# Patient Record
Sex: FEMALE | URBAN - METROPOLITAN AREA
[De-identification: names, ages, dates, MRNs, and addresses within clinical notes are randomized per-mention and may not be internally consistent; named-entity substitution may affect disease eponyms.]

---

## 2018-12-07 ENCOUNTER — OFFICE VISIT (OUTPATIENT)
Dept: URBAN - METROPOLITAN AREA CLINIC 81 | Facility: CLINIC | Age: 68
End: 2018-12-07
Payer: MEDICARE

## 2018-12-07 DIAGNOSIS — H31.013 MACULA SCARS OF POSTERIOR POLE (POSTINFLAMMATORY) (POST-TRAUMATIC), BILATERAL: ICD-10-CM

## 2018-12-07 DIAGNOSIS — H43.813 VITREOUS DEGENERATION, BILATERAL: ICD-10-CM

## 2018-12-07 DIAGNOSIS — H52.03 HYPERMETROPIA, BILATERAL: ICD-10-CM

## 2018-12-07 PROCEDURE — 92004 COMPRE OPH EXAM NEW PT 1/>: CPT | Performed by: OPHTHALMOLOGY

## 2018-12-07 ASSESSMENT — INTRAOCULAR PRESSURE
OS: 14
OD: 14

## 2018-12-07 ASSESSMENT — KERATOMETRY
OD: 44.38
OS: 44.75

## 2018-12-07 ASSESSMENT — VISUAL ACUITY
OD: 20/50
OS: 20/40

## 2018-12-07 NOTE — IMPRESSION/PLAN
Impression: Other injuries of left eye and orbit, sequela: S05.8x2S. OS.  by Hx
trauma to OS as child.  Plan: Continue to monitor

## 2018-12-07 NOTE — IMPRESSION/PLAN
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 Sister with Best's Disease. Previously seen by retina with genetic testing showing markers for disease. Was never given diagnosis of Best's Plan: Extensive discussion of retinal issues, Can be difficult to determine source of retinal scarring. Discussed overall influence on vision and option to proceed with ceiol.   Pt may consider consult with Thuan Hidalgo

## 2018-12-07 NOTE — IMPRESSION/PLAN
Impression: Age-related nuclear cataract, bilateral: H25.13 OU. Visually significant. Signficant discussion regarding retinal issues and how ce iol may improved vision however unable to determine level of improvement.  Plan: Pt will think about surgery

## 2019-07-18 ENCOUNTER — OFFICE VISIT (OUTPATIENT)
Dept: URBAN - METROPOLITAN AREA CLINIC 81 | Facility: CLINIC | Age: 69
End: 2019-07-18
Payer: MEDICARE

## 2019-07-18 DIAGNOSIS — H25.13 AGE-RELATED NUCLEAR CATARACT, BILATERAL: Primary | ICD-10-CM

## 2019-07-18 DIAGNOSIS — S05.8X2S OTHER INJURIES OF LEFT EYE AND ORBIT, SEQUELA: ICD-10-CM

## 2019-07-18 DIAGNOSIS — H52.4 PRESBYOPIA: ICD-10-CM

## 2019-07-18 PROCEDURE — 99215 OFFICE O/P EST HI 40 MIN: CPT | Performed by: OPHTHALMOLOGY

## 2019-07-18 RX ORDER — DUREZOL 0.5 MG/ML
0.05 % EMULSION OPHTHALMIC
Qty: 1 | Refills: 1 | Status: INACTIVE
Start: 2019-07-18 | End: 2019-09-23

## 2019-07-18 RX ORDER — OFLOXACIN 3 MG/ML
0.3 % SOLUTION/ DROPS OPHTHALMIC
Qty: 1 | Refills: 1 | Status: INACTIVE
Start: 2019-07-18 | End: 2019-09-23

## 2019-07-18 ASSESSMENT — VISUAL ACUITY
OD: 20/60
OS: 20/50

## 2019-07-18 ASSESSMENT — INTRAOCULAR PRESSURE
OD: 16
OS: 16

## 2019-07-18 ASSESSMENT — KERATOMETRY
OS: 45.00
OD: 44.50

## 2019-07-18 NOTE — IMPRESSION/PLAN
Impression: Other injuries of left eye and orbit, sequela: S05.8x2S. OS.  by Hx
trauma to OS as child. Plan: Continue to monitor. Discussed added risk.

## 2019-07-18 NOTE — IMPRESSION/PLAN
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 Sister with Best's Disease. Previously seen by retina with genetic testing showing markers for disease. Was never given diagnosis of Best's Plan: Extensive discussion of retinal issues, Can be difficult to determine source of retinal scarring. Discussed overall influence on vision and option to proceed with ceiol. Recommend consult w/ Dr Guerrero Alexis prior to cataract sx. Discussed added risk.

## 2019-07-18 NOTE — IMPRESSION/PLAN
Impression: Age-related nuclear cataract, bilateral: H25.13 OU. Visually significant. Signficant discussion regarding retinal issues and how ce iol may improved vision however unable to determine level of improvement. Plan: Cataracts account for the patient's complaints. Discussed all risks, benefits, procedures and recovery. Patient understands changing glasses will not improve vision. Discussed added risk due to RPE scarring OU, injury to OS, and astigmatism. Patient desires to have surgery, recommend CE IOL OU, OD first.  Discussed iol options, recommend STANDARD  IOL . TARGET: DISTANCE   RL2 Discussed astigmatism diagnosis with patient. Patient understands that standard lens does not correct for astigmatism and patient will need gls for distance and near after Cataract Surgery. Patient understands. Patient will need consult w/ Dr Caridad Fields prior to cataract sx.  
Will need Ascan

## 2019-09-09 ENCOUNTER — OFFICE VISIT (OUTPATIENT)
Dept: URBAN - METROPOLITAN AREA CLINIC 76 | Facility: CLINIC | Age: 69
End: 2019-09-09
Payer: MEDICARE

## 2019-09-09 DIAGNOSIS — H35.343 MACULAR CYST, HOLE, OR PSEUDOHOLE, BILATERAL: Primary | ICD-10-CM

## 2019-09-09 PROCEDURE — 92134 CPTRZ OPH DX IMG PST SGM RTA: CPT | Performed by: OPHTHALMOLOGY

## 2019-09-09 PROCEDURE — 99213 OFFICE O/P EST LOW 20 MIN: CPT | Performed by: OPHTHALMOLOGY

## 2019-09-09 ASSESSMENT — INTRAOCULAR PRESSURE
OD: 15
OS: 15

## 2019-09-09 NOTE — IMPRESSION/PLAN
Impression: Age-related nuclear cataract, bilateral: H25.13. OU.  Plan: No retinal contraindication to CE IOL

## 2019-09-09 NOTE — IMPRESSION/PLAN
Impression: Macular cyst, hole, or pseudohole, bilateral: H35.343. OU. Plan: OCT ordered and performed today. Discussed diagnosis with patient. The clinical exam and OCT are consistent with Macular Hole. Given the patients current symptoms and difficulties with daily activities surgical correction is not recommended. Discussed with patient I recommend observation at this time due to RPE and pigment disruption. Patient understands and agrees with plan. Discussed microscopic telescope lens, having great distance vision but loss of peripheral vision. Discussed with patient to research her options prior to having surgery, due to having a macular disease. Recommend a low vision consult for the future or prior to having cataract surgery. Discussed in great length and detail with patient her options and given Dr. Narcisa Marquez information.

## 2019-09-23 ENCOUNTER — PRE-OPERATIVE VISIT (OUTPATIENT)
Dept: URBAN - METROPOLITAN AREA CLINIC 76 | Facility: CLINIC | Age: 69
End: 2019-09-23
Payer: MEDICARE

## 2019-09-23 PROCEDURE — 92136 OPHTHALMIC BIOMETRY: CPT | Performed by: OPHTHALMOLOGY

## 2019-09-23 RX ORDER — DUREZOL 0.5 MG/ML
0.05 % EMULSION OPHTHALMIC
Qty: 5 | Refills: 1 | Status: INACTIVE
Start: 2019-09-23 | End: 2019-11-22

## 2019-09-23 ASSESSMENT — PACHYMETRY
OS: 22.23
OS: 2.71
OD: 21.96
OD: 2.66

## 2019-10-07 ENCOUNTER — SURGERY (OUTPATIENT)
Dept: URBAN - METROPOLITAN AREA SURGERY 47 | Facility: SURGERY | Age: 69
End: 2019-10-07
Payer: MEDICARE

## 2019-10-07 PROCEDURE — 66984 XCAPSL CTRC RMVL W/O ECP: CPT | Performed by: OPHTHALMOLOGY

## 2019-10-08 ENCOUNTER — POST-OPERATIVE VISIT (OUTPATIENT)
Dept: URBAN - METROPOLITAN AREA CLINIC 81 | Facility: CLINIC | Age: 69
End: 2019-10-08
Payer: MEDICARE

## 2019-10-08 PROCEDURE — 99024 POSTOP FOLLOW-UP VISIT: CPT | Performed by: OPTOMETRIST

## 2019-10-08 ASSESSMENT — INTRAOCULAR PRESSURE
OS: 16
OD: 16

## 2019-10-17 ENCOUNTER — POST-OPERATIVE VISIT (OUTPATIENT)
Dept: URBAN - METROPOLITAN AREA CLINIC 81 | Facility: CLINIC | Age: 69
End: 2019-10-17
Payer: MEDICARE

## 2019-10-17 PROCEDURE — 99024 POSTOP FOLLOW-UP VISIT: CPT | Performed by: OPTOMETRIST

## 2019-10-17 ASSESSMENT — INTRAOCULAR PRESSURE: OD: 12

## 2019-10-17 ASSESSMENT — VISUAL ACUITY
OS: 20/40-
OD: 20/70-

## 2019-10-24 ENCOUNTER — POST-OPERATIVE VISIT (OUTPATIENT)
Dept: URBAN - METROPOLITAN AREA CLINIC 76 | Facility: CLINIC | Age: 69
End: 2019-10-24
Payer: MEDICARE

## 2019-10-24 PROCEDURE — 99024 POSTOP FOLLOW-UP VISIT: CPT | Performed by: OPHTHALMOLOGY

## 2019-10-24 ASSESSMENT — KERATOMETRY
OS: 45.00
OD: 45.00

## 2019-10-24 ASSESSMENT — INTRAOCULAR PRESSURE
OD: 12
OS: 14

## 2019-10-24 ASSESSMENT — VISUAL ACUITY
OS: 20/40
OD: 20/200

## 2019-10-29 ENCOUNTER — POST-OPERATIVE VISIT (OUTPATIENT)
Dept: URBAN - METROPOLITAN AREA CLINIC 76 | Facility: CLINIC | Age: 69
End: 2019-10-29
Payer: MEDICARE

## 2019-10-29 PROCEDURE — 99024 POSTOP FOLLOW-UP VISIT: CPT | Performed by: OPHTHALMOLOGY

## 2019-10-29 ASSESSMENT — VISUAL ACUITY
OS: 20/40-
OD: 20/70-

## 2019-10-29 ASSESSMENT — INTRAOCULAR PRESSURE
OS: 14
OD: 13

## 2019-11-07 ENCOUNTER — POST-OPERATIVE VISIT (OUTPATIENT)
Dept: URBAN - METROPOLITAN AREA CLINIC 81 | Facility: CLINIC | Age: 69
End: 2019-11-07
Payer: MEDICARE

## 2019-11-07 DIAGNOSIS — Z09 ENCNTR FOR F/U EXAM AFT TRTMT FOR COND OTH THAN MALIG NEOPLM: Primary | ICD-10-CM

## 2019-11-07 PROCEDURE — 99024 POSTOP FOLLOW-UP VISIT: CPT | Performed by: OPHTHALMOLOGY

## 2019-11-07 ASSESSMENT — INTRAOCULAR PRESSURE
OD: 16
OS: 16

## 2019-11-07 ASSESSMENT — VISUAL ACUITY
OD: 20/80-
OS: 20/40+2

## 2019-11-22 ENCOUNTER — POST-OPERATIVE VISIT (OUTPATIENT)
Dept: URBAN - METROPOLITAN AREA CLINIC 81 | Facility: CLINIC | Age: 69
End: 2019-11-22
Payer: MEDICARE

## 2019-11-22 PROCEDURE — 99024 POSTOP FOLLOW-UP VISIT: CPT | Performed by: OPHTHALMOLOGY

## 2019-11-22 RX ORDER — KETOROLAC TROMETHAMINE 5 MG/ML
0.5 % SOLUTION OPHTHALMIC
Qty: 5 | Refills: 2 | Status: INACTIVE
Start: 2019-11-22 | End: 2019-12-05

## 2019-11-22 ASSESSMENT — VISUAL ACUITY: OS: 20/40+

## 2019-11-22 ASSESSMENT — INTRAOCULAR PRESSURE
OS: 16
OD: 13

## 2019-12-05 ENCOUNTER — OFFICE VISIT (OUTPATIENT)
Dept: URBAN - METROPOLITAN AREA CLINIC 76 | Facility: CLINIC | Age: 69
End: 2019-12-05
Payer: MEDICARE

## 2019-12-05 DIAGNOSIS — Z96.1 PRESENCE OF INTRAOCULAR LENS: ICD-10-CM

## 2019-12-05 PROCEDURE — 92012 INTRM OPH EXAM EST PATIENT: CPT | Performed by: OPHTHALMOLOGY

## 2019-12-05 PROCEDURE — 92134 CPTRZ OPH DX IMG PST SGM RTA: CPT | Performed by: OPHTHALMOLOGY

## 2019-12-05 ASSESSMENT — INTRAOCULAR PRESSURE
OD: 10
OS: 11

## 2019-12-05 NOTE — IMPRESSION/PLAN
Impression: Macular cyst, hole, or pseudohole, bilateral: H35.343 OU. Plan: Exam/OCT show stable deep lamellar hole, RPE changes, atrophy, and cystic changes OU, no change when compared to previous scans. No intervention indicated, will follow. 

6 months, OCT OU

## 2019-12-20 ENCOUNTER — POST-OPERATIVE VISIT (OUTPATIENT)
Dept: URBAN - METROPOLITAN AREA CLINIC 81 | Facility: CLINIC | Age: 69
End: 2019-12-20
Payer: MEDICARE

## 2019-12-20 PROCEDURE — 99024 POSTOP FOLLOW-UP VISIT: CPT | Performed by: OPHTHALMOLOGY

## 2019-12-20 ASSESSMENT — INTRAOCULAR PRESSURE
OS: 16
OD: 13

## 2020-01-27 ENCOUNTER — OFFICE VISIT (OUTPATIENT)
Dept: URBAN - METROPOLITAN AREA CLINIC 76 | Facility: CLINIC | Age: 70
End: 2020-01-27
Payer: MEDICARE

## 2020-01-27 PROCEDURE — 92134 CPTRZ OPH DX IMG PST SGM RTA: CPT | Performed by: OPHTHALMOLOGY

## 2020-01-27 PROCEDURE — 99214 OFFICE O/P EST MOD 30 MIN: CPT | Performed by: OPHTHALMOLOGY

## 2020-01-27 RX ORDER — OFLOXACIN 3 MG/ML
0.3 % SOLUTION/ DROPS OPHTHALMIC
Qty: 1 | Refills: 0 | Status: INACTIVE
Start: 2020-01-27 | End: 2020-08-26

## 2020-01-27 RX ORDER — PREDNISOLONE ACETATE 10 MG/ML
1 % SUSPENSION/ DROPS OPHTHALMIC
Qty: 1 | Refills: 2 | Status: INACTIVE
Start: 2020-01-27 | End: 2020-08-26

## 2020-01-27 ASSESSMENT — INTRAOCULAR PRESSURE
OD: 10
OS: 10

## 2020-01-27 NOTE — IMPRESSION/PLAN
Impression: Macular cyst, hole, or pseudohole, bilateral: H35.343. OU. Lamellar hole OS, with atrophy Plan: OCT ordered and performed today. Discussed diagnosis with patient. The clinical exam and OCT are consistent with Macular Hole. Given the patients current symptoms and difficulties with daily activities surgical correction is recommended. Prognosis is guarded due to quality of vision prior to CE surgery, Recommend sx, after a through discussion of surgical R/B/A. The patient understands the potential risks of sx, including (but not limited to) bleeding, pain, infection, loss of vision, loss of eye and possible need for more sx. The patient was also informed of post operative altitude with caution with face down positioning with gas in the eye. The patient understand if the hole closes successfully the vision usually improves, however guarded prognosis due to previous vision quality. The patient elects to proceed with sx.  RL-2

## 2020-02-24 ENCOUNTER — SURGERY (OUTPATIENT)
Dept: URBAN - METROPOLITAN AREA SURGERY 47 | Facility: SURGERY | Age: 70
End: 2020-02-24
Payer: MEDICARE

## 2020-02-24 PROCEDURE — 67040 LASER TREATMENT OF RETINA: CPT | Performed by: OPHTHALMOLOGY

## 2020-02-24 RX ORDER — HYDROCODONE BITARTRATE AND IBUPROFEN 7.5; 2 MG/1; MG/1
TABLET, FILM COATED ORAL
Qty: 6 | Refills: 0 | Status: INACTIVE
Start: 2020-02-24 | End: 2020-02-28

## 2020-02-25 ENCOUNTER — POST-OPERATIVE VISIT (OUTPATIENT)
Dept: URBAN - METROPOLITAN AREA CLINIC 76 | Facility: CLINIC | Age: 70
End: 2020-02-25
Payer: MEDICARE

## 2020-02-25 ASSESSMENT — INTRAOCULAR PRESSURE
OD: 12
OS: 11

## 2020-02-25 NOTE — IMPRESSION/PLAN
Impression: S/P VItrectomy w/Rem Gas Bubble OD - 1 Day. Post operative instructions reviewed - looks good Plan: Continue to monitor. Start Pred Forte and Ocuflox as directed. Patient face down 1 week or as directed by Dr Caridad Fields.

## 2020-02-28 ENCOUNTER — POST-OPERATIVE VISIT (OUTPATIENT)
Dept: URBAN - METROPOLITAN AREA CLINIC 76 | Facility: CLINIC | Age: 70
End: 2020-02-28
Payer: MEDICARE

## 2020-02-28 PROCEDURE — 99024 POSTOP FOLLOW-UP VISIT: CPT | Performed by: OPHTHALMOLOGY

## 2020-02-28 RX ORDER — HYDROCODONE BITARTRATE AND IBUPROFEN 7.5; 2 MG/1; MG/1
TABLET, FILM COATED ORAL
Qty: 6 | Refills: 0 | Status: INACTIVE
Start: 2020-02-28 | End: 2021-04-27

## 2020-02-28 ASSESSMENT — INTRAOCULAR PRESSURE
OD: 6
OS: 11

## 2020-03-06 ENCOUNTER — OFFICE VISIT (OUTPATIENT)
Dept: URBAN - METROPOLITAN AREA CLINIC 81 | Facility: CLINIC | Age: 70
End: 2020-03-06
Payer: MEDICARE

## 2020-03-06 DIAGNOSIS — H25.12 AGE-RELATED NUCLEAR CATARACT, LEFT EYE: ICD-10-CM

## 2020-03-06 DIAGNOSIS — H00.014 HORDEOLUM OF LEFT UPPER EYELID: Primary | ICD-10-CM

## 2020-03-06 PROCEDURE — 92012 INTRM OPH EXAM EST PATIENT: CPT | Performed by: OPHTHALMOLOGY

## 2020-03-06 RX ORDER — TOBRAMYCIN AND DEXAMETHASONE 3; 1 MG/ML; MG/ML
SUSPENSION/ DROPS OPHTHALMIC
Qty: 1 | Refills: 0 | Status: INACTIVE
Start: 2020-03-06 | End: 2020-08-26

## 2020-03-06 ASSESSMENT — INTRAOCULAR PRESSURE: OS: 18

## 2020-08-26 ENCOUNTER — OFFICE VISIT (OUTPATIENT)
Dept: URBAN - METROPOLITAN AREA CLINIC 81 | Facility: CLINIC | Age: 70
End: 2020-08-26
Payer: MEDICARE

## 2020-08-26 PROCEDURE — 99212 OFFICE O/P EST SF 10 MIN: CPT | Performed by: OPTOMETRIST

## 2020-08-26 RX ORDER — TOBRAMYCIN AND DEXAMETHASONE 3; 1 MG/ML; MG/ML
SUSPENSION/ DROPS OPHTHALMIC
Qty: 1 | Refills: 1 | Status: INACTIVE
Start: 2020-08-26 | End: 2021-04-27

## 2020-08-26 ASSESSMENT — INTRAOCULAR PRESSURE
OS: 10
OD: 9

## 2020-08-26 NOTE — IMPRESSION/PLAN
Impression: Dry eye syndrome of bilateral lacrimal glands: R49.278.

 - OD>OS
 - Cornea appears grossly clear today
 - pt reports nasal lid margin involved as well. .. may be low-key dacryo infection/etc. 
 Plan: Rx: tobradex TID x14 days then 2,1 x1 week each RTC PRN if not improved/worsens

## 2021-04-27 ENCOUNTER — OFFICE VISIT (OUTPATIENT)
Dept: URBAN - METROPOLITAN AREA CLINIC 81 | Facility: CLINIC | Age: 71
End: 2021-04-27
Payer: MEDICARE

## 2021-04-27 DIAGNOSIS — H04.123 DRY EYE SYNDROME OF BILATERAL LACRIMAL GLANDS: Chronic | ICD-10-CM

## 2021-04-27 PROCEDURE — 92134 CPTRZ OPH DX IMG PST SGM RTA: CPT | Performed by: OPTOMETRIST

## 2021-04-27 PROCEDURE — 92014 COMPRE OPH EXAM EST PT 1/>: CPT | Performed by: OPTOMETRIST

## 2021-04-27 RX ORDER — LOTEPREDNOL ETABONATE 5 MG/ML
0.5 % SUSPENSION/ DROPS OPHTHALMIC
Qty: 5 | Refills: 1 | Status: INACTIVE
Start: 2021-04-27 | End: 2022-04-04

## 2021-04-27 RX ORDER — CYCLOSPORINE 0.5 MG/ML
0.05 % EMULSION OPHTHALMIC
Qty: 60 | Refills: 11 | Status: INACTIVE
Start: 2021-04-27 | End: 2022-04-04

## 2021-04-27 ASSESSMENT — INTRAOCULAR PRESSURE
OD: 9
OS: 12

## 2021-04-27 ASSESSMENT — KERATOMETRY
OD: 44.75
OS: 44.75

## 2021-04-27 ASSESSMENT — VISUAL ACUITY: OS: 20/60

## 2021-04-27 NOTE — IMPRESSION/PLAN
Impression: Dry eye syndrome of bilateral lacrimal glands: H04.123. Plan: Worsening. Failed conservative management with Prednisolone, and AT. Start Lotemax OU QID x 7 days then BID x 7 days. Start Restasis OU BID (sample dispensed). Discussed diagnosis in detail with patient. Dry eye accounts for the patient's symptoms. Dry eye is a chronic condition and does not have a cure and will need artificial tears for maintenance. Recommend Systane Complete or Refresh Relieva OU QID longterm. Monitor for changes.

## 2021-04-27 NOTE — IMPRESSION/PLAN
Impression: Age-related nuclear cataract, left eye: H25.12. Plan: Observe for worsening. No treatment currently recommended as cataracts do not affect the patients day to day life. Patient to monitor for vision changes and if vision significantly worsens, advised to RTC for evaluation.

## 2021-04-27 NOTE — IMPRESSION/PLAN
Impression: Macular cyst, hole, or pseudohole, bilateral: H35.343. OU.
-OD s/p vitrectomy with gas bubble 02/24/2020
-OS Lamellar hole, with atrophy Plan: Discussed diagnosis with patient. Order OCT macula, stable OU. Recommend observation at this time.

## 2022-04-04 ENCOUNTER — OFFICE VISIT (OUTPATIENT)
Dept: URBAN - METROPOLITAN AREA CLINIC 43 | Facility: CLINIC | Age: 72
End: 2022-04-04
Payer: MEDICARE

## 2022-04-04 DIAGNOSIS — H43.812 VITREOUS DEGENERATION, LEFT EYE: ICD-10-CM

## 2022-04-04 DIAGNOSIS — H25.812 COMBINED FORMS OF AGE-RELATED CATARACT, LEFT EYE: ICD-10-CM

## 2022-04-04 PROCEDURE — 92134 CPTRZ OPH DX IMG PST SGM RTA: CPT | Performed by: OPTOMETRIST

## 2022-04-04 PROCEDURE — 99204 OFFICE O/P NEW MOD 45 MIN: CPT | Performed by: OPTOMETRIST

## 2022-04-04 ASSESSMENT — KERATOMETRY
OD: 44.13
OS: 44.50

## 2022-04-04 ASSESSMENT — INTRAOCULAR PRESSURE
OD: 8
OS: 8

## 2022-04-04 ASSESSMENT — VISUAL ACUITY
OS: 20/40
OD: 20/100

## 2022-04-04 NOTE — IMPRESSION/PLAN
Impression: Macular cyst, hole, or pseudohole, bilateral: H35.343.  OU.
family history of Best's disease: sister Plan: -OD s/p vitrectomy with gas bubble 02/24/2020, still atrophy on/ hole on mac OCT, cause for reduced BCVA
-OS Lamellar hole, with atrophy, limiting vision, no SRF/CNV on mac OCT OU
will schedule retina consult 3-4 weeks

## 2022-04-04 NOTE — IMPRESSION/PLAN
Impression: Vitreous degeneration, left eye: H43.132. Plan: Warning signs of retinal tear and detachment discussed with patient. To return to clinic STAT if any change in symptoms consistent with RT or RD.

## 2022-04-25 ENCOUNTER — OFFICE VISIT (OUTPATIENT)
Dept: URBAN - METROPOLITAN AREA CLINIC 44 | Facility: CLINIC | Age: 72
End: 2022-04-25
Payer: MEDICARE

## 2022-04-25 DIAGNOSIS — H35.50 UNSPECIFIED HEREDITARY RETINAL DYSTROPHY: Primary | ICD-10-CM

## 2022-04-25 PROCEDURE — 92004 COMPRE OPH EXAM NEW PT 1/>: CPT | Performed by: OPHTHALMOLOGY

## 2022-04-25 PROCEDURE — 92134 CPTRZ OPH DX IMG PST SGM RTA: CPT | Performed by: OPHTHALMOLOGY

## 2022-04-25 ASSESSMENT — INTRAOCULAR PRESSURE
OS: 11
OD: 10

## 2022-04-25 NOTE — IMPRESSION/PLAN
Impression: Unspecified hereditary retinal dystrophy: H35.50. Plan: Retinal atrophy - ddx includes consolidated Bests disease vs cone dystrophy. Discussed current research efforts. Observe. Retina PRN Refer to low vision for evaluation